# Patient Record
Sex: FEMALE | Race: WHITE
[De-identification: names, ages, dates, MRNs, and addresses within clinical notes are randomized per-mention and may not be internally consistent; named-entity substitution may affect disease eponyms.]

---

## 2019-02-11 ENCOUNTER — HOSPITAL ENCOUNTER (OUTPATIENT)
Dept: HOSPITAL 53 - M LAB | Age: 49
End: 2019-02-11
Attending: PHYSICIAN ASSISTANT
Payer: COMMERCIAL

## 2019-02-11 DIAGNOSIS — E55.9: Primary | ICD-10-CM

## 2019-02-11 DIAGNOSIS — R53.83: ICD-10-CM

## 2019-02-11 LAB
25(OH)D3 SERPL-MCNC: 27.8 NG/ML (ref 30–100)
ALBUMIN SERPL BCG-MCNC: 3.7 GM/DL (ref 3.2–5.2)
ALT SERPL W P-5'-P-CCNC: 28 U/L (ref 12–78)
BASOPHILS # BLD AUTO: 0 10^3/UL (ref 0–0.2)
BASOPHILS NFR BLD AUTO: 0.5 % (ref 0–1)
BILIRUB SERPL-MCNC: 0.3 MG/DL (ref 0.2–1)
BUN SERPL-MCNC: 18 MG/DL (ref 7–18)
CALCIUM SERPL-MCNC: 8.8 MG/DL (ref 8.5–10.1)
CHLORIDE SERPL-SCNC: 105 MEQ/L (ref 98–107)
CHOLEST SERPL-MCNC: 170 MG/DL (ref ?–200)
CHOLEST/HDLC SERPL: 2.3 {RATIO} (ref ?–5)
CO2 SERPL-SCNC: 29 MEQ/L (ref 21–32)
CREAT SERPL-MCNC: 0.89 MG/DL (ref 0.55–1.3)
EOSINOPHIL # BLD AUTO: 0.2 10^3/UL (ref 0–0.5)
EOSINOPHIL NFR BLD AUTO: 3 % (ref 0–3)
GFR SERPL CREATININE-BSD FRML MDRD: > 60 ML/MIN/{1.73_M2} (ref 58–?)
GLUCOSE SERPL-MCNC: 80 MG/DL (ref 70–100)
HCT VFR BLD AUTO: 42 % (ref 36–47)
HDLC SERPL-MCNC: 74 MG/DL (ref 40–?)
HGB BLD-MCNC: 13.5 G/DL (ref 12–15.5)
LDLC SERPL CALC-MCNC: 77 MG/DL (ref ?–100)
LYMPHOCYTES # BLD AUTO: 2.3 10^3/UL (ref 1.5–4.5)
LYMPHOCYTES NFR BLD AUTO: 29.8 % (ref 24–44)
MCH RBC QN AUTO: 29.2 PG (ref 27–33)
MCHC RBC AUTO-ENTMCNC: 32.1 G/DL (ref 32–36.5)
MCV RBC AUTO: 90.9 FL (ref 80–96)
MONOCYTES # BLD AUTO: 0.7 10^3/UL (ref 0–0.8)
MONOCYTES NFR BLD AUTO: 8.6 % (ref 0–5)
NEUTROPHILS # BLD AUTO: 4.4 10^3/UL (ref 1.8–7.7)
NEUTROPHILS NFR BLD AUTO: 57.7 % (ref 36–66)
NONHDLC SERPL-MCNC: 96 MG/DL
PLATELET # BLD AUTO: 286 10^3/UL (ref 150–450)
POTASSIUM SERPL-SCNC: 4.4 MEQ/L (ref 3.5–5.1)
PROT SERPL-MCNC: 7 GM/DL (ref 6.4–8.2)
RBC # BLD AUTO: 4.62 10^6/UL (ref 4–5.4)
SODIUM SERPL-SCNC: 140 MEQ/L (ref 136–145)
T4 FREE SERPL-MCNC: 0.83 NG/DL (ref 0.76–1.46)
TRIGL SERPL-MCNC: 96 MG/DL (ref ?–150)
TSH SERPL DL<=0.005 MIU/L-ACNC: 2.28 UIU/ML (ref 0.36–3.74)
WBC # BLD AUTO: 7.7 10^3/UL (ref 4–10)

## 2019-11-08 ENCOUNTER — HOSPITAL ENCOUNTER (EMERGENCY)
Dept: HOSPITAL 53 - M ED | Age: 49
Discharge: HOME | End: 2019-11-08
Payer: COMMERCIAL

## 2019-11-08 VITALS — DIASTOLIC BLOOD PRESSURE: 72 MMHG | SYSTOLIC BLOOD PRESSURE: 122 MMHG

## 2019-11-08 VITALS — WEIGHT: 142.31 LBS | BODY MASS INDEX: 26.19 KG/M2 | HEIGHT: 62 IN

## 2019-11-08 DIAGNOSIS — M77.32: Primary | ICD-10-CM

## 2019-11-08 DIAGNOSIS — J45.909: ICD-10-CM

## 2019-11-08 DIAGNOSIS — Z79.899: ICD-10-CM
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
## 2019-11-08 DIAGNOSIS — Z88.6: ICD-10-CM
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
## 2019-11-08 DIAGNOSIS — M79.675: ICD-10-CM
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
## 2019-11-08 NOTE — REP
Left great toe series:  Four views.

 

History:  Great toe pain and swelling.

 

Findings:  There is mild osteoarthritic spurring at the first MTP joint along its

lateral side.  A small accessory ossicle is seen and medial aspect of the joint.

No erosive changes seen.  Sesamoid bones are unremarkable.  Incidental note is

made of a small plantar calcaneal spur.

 

Impression:

 

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  Otherwise negative.

 

 

Electronically Signed by

Johnnie Rae MD 11/08/2019 01:22 P

## 2021-06-24 ENCOUNTER — HOSPITAL ENCOUNTER (OUTPATIENT)
Dept: HOSPITAL 53 - M WHC | Age: 51
End: 2021-06-24
Attending: UROLOGY
Payer: COMMERCIAL

## 2021-06-24 DIAGNOSIS — Z12.31: Primary | ICD-10-CM

## 2021-06-24 NOTE — REPMRS
Patient History

The patient states she had a clinical breast exam in  April 2021.

Patient is postmenopausal.

Family history of colorectal cancer at age 52 in father, ovarian 

cancer under age 50 in maternal aunt.

Patient states no breast complaints today. Patient has signed MRS

History Sheet.

 

Digital Woman Screen Mammo: June 24, 2021 - Exam #: 

[id number]

Bilateral CC and MLO view(s) were taken.

 

Technologist: Chanel Paz, Technologist

Prior study comparison: June 8, 2020, bilateral digital mammo 

screening bilat, performed at [facility].  March 21, 2019, bilateral digital mammo screening bilat, performed at 

[facility].  March 20, 2018, bilateral digital 

mammo screening bilat, performed at [facility].

 

FINDINGS: There are scattered fibroglandular densities.  The 

Volpara volumetric breast density category is:B.  There has been 

no change in the appearance of the mammogram from the prior 

studies.  There is a mild amount of scattered fibroglandular 

density which is fairly symmetric. There is no interval 

development of dominant mass, architectural distortion, or 

grouped microcalcification suggestive of malignancy.

3-D tomosynthesis shows no additional findings.

 

Assessment: BI-RADS/ACR category 1 mammogram. Negative Mammogram.

 

Recommendation

Routine screening mammogram of both breasts in 1 year (for women 

over age 40).

This patient's  [facility] Lifetime Breast Cancer Risk is 

estimated at 8.0 %.

This mammogram was interpreted with the aid of an FDA-approved 

computer-aided dectection system.

 

Electronically Signed By: Rene Rae MD 06/24/21 [id number]

## 2021-11-30 ENCOUNTER — HOSPITAL ENCOUNTER (OUTPATIENT)
Dept: HOSPITAL 53 - M LAB REF | Age: 51
End: 2021-11-30
Attending: PHYSICIAN ASSISTANT
Payer: COMMERCIAL

## 2021-11-30 DIAGNOSIS — N39.0: Primary | ICD-10-CM

## 2021-11-30 LAB
APPEARANCE UR: (no result)
BACTERIA UR QL AUTO: NEGATIVE
BILIRUB UR QL STRIP.AUTO: NEGATIVE
GLUCOSE UR QL STRIP.AUTO: NEGATIVE MG/DL
HGB UR QL STRIP.AUTO: (no result)
KETONES UR QL STRIP.AUTO: NEGATIVE MG/DL
LEUKOCYTE ESTERASE UR QL STRIP.AUTO: (no result)
MUCOUS THREADS URNS QL MICRO: (no result)
NITRITE UR QL STRIP.AUTO: NEGATIVE
PH UR STRIP.AUTO: 5 UNITS (ref 5–9)
PROT UR QL STRIP.AUTO: NEGATIVE MG/DL
RBC # UR AUTO: 12 /HPF (ref 0–3)
SP GR UR STRIP.AUTO: 1.02 (ref 1–1.03)
SQUAMOUS #/AREA URNS AUTO: 0 /HPF (ref 0–6)
UROBILINOGEN UR QL STRIP.AUTO: 0.2 MG/DL (ref 0–2)
WBC #/AREA URNS AUTO: 50 /HPF (ref 0–3)

## 2022-06-27 ENCOUNTER — HOSPITAL ENCOUNTER (OUTPATIENT)
Dept: HOSPITAL 53 - M WHC | Age: 52
End: 2022-06-27
Attending: UROLOGY
Payer: COMMERCIAL

## 2022-06-27 DIAGNOSIS — Z12.31: Primary | ICD-10-CM

## 2022-07-27 ENCOUNTER — HOSPITAL ENCOUNTER (OUTPATIENT)
Dept: HOSPITAL 53 - M LABSMTC | Age: 52
End: 2022-07-27
Attending: INTERNAL MEDICINE
Payer: COMMERCIAL

## 2022-07-27 DIAGNOSIS — Z01.812: Primary | ICD-10-CM

## 2022-07-27 DIAGNOSIS — Z20.822: ICD-10-CM

## 2022-08-01 ENCOUNTER — HOSPITAL ENCOUNTER (OUTPATIENT)
Dept: HOSPITAL 53 - M OPP | Age: 52
Discharge: HOME | End: 2022-08-01
Attending: INTERNAL MEDICINE
Payer: COMMERCIAL

## 2022-08-01 VITALS — WEIGHT: 139.4 LBS | BODY MASS INDEX: 25.65 KG/M2 | HEIGHT: 62 IN

## 2022-08-01 VITALS — SYSTOLIC BLOOD PRESSURE: 114 MMHG | DIASTOLIC BLOOD PRESSURE: 65 MMHG

## 2022-08-01 DIAGNOSIS — Z91.89: ICD-10-CM

## 2022-08-01 DIAGNOSIS — K64.0: ICD-10-CM

## 2022-08-01 DIAGNOSIS — Z79.899: ICD-10-CM

## 2022-08-01 DIAGNOSIS — D12.6: ICD-10-CM

## 2022-08-01 DIAGNOSIS — K21.00: ICD-10-CM

## 2022-08-01 DIAGNOSIS — Z12.11: Primary | ICD-10-CM

## 2022-08-01 DIAGNOSIS — Z88.6: ICD-10-CM

## 2022-08-01 DIAGNOSIS — J45.909: ICD-10-CM

## 2022-08-01 DIAGNOSIS — Z79.51: ICD-10-CM

## 2022-10-16 ENCOUNTER — HOSPITAL ENCOUNTER (OUTPATIENT)
Dept: HOSPITAL 53 - M LAB REF | Age: 52
End: 2022-10-16
Attending: PHYSICIAN ASSISTANT
Payer: COMMERCIAL

## 2022-10-16 DIAGNOSIS — N39.0: Primary | ICD-10-CM

## 2022-10-16 LAB
APPEARANCE UR: (no result)
BACTERIA URNS QL MICRO: (no result)
BILIRUB UR QL STRIP: NEGATIVE
COLOR UR: YELLOW
GLUCOSE UR STRIP-MCNC: NEGATIVE MG/DL
HGB UR QL STRIP: POSITIVE
KETONES UR QL STRIP: NEGATIVE MG/DL
LEUKOCYTE ESTERASE UR QL STRIP: POSITIVE
NITRITE UR QL STRIP: POSITIVE
PH UR STRIP: 8 UNITS (ref 5–7)
PROT UR STRIP-MCNC: (no result) MG/DL
RBC #/AREA URNS HPF: (no result) /HPF (ref 0–3)
SP GR UR STRIP: 1.01 (ref 1–1.03)
SQUAMOUS URNS QL MICRO: (no result) /HPF
UROBILINOGEN UR QL STRIP: NORMAL MG/DL
WBC #/AREA URNS HPF: (no result) /HPF (ref 0–3)

## 2023-07-10 ENCOUNTER — HOSPITAL ENCOUNTER (OUTPATIENT)
Dept: HOSPITAL 53 - M WHC | Age: 53
End: 2023-07-10
Payer: COMMERCIAL

## 2023-07-10 DIAGNOSIS — Z12.31: Primary | ICD-10-CM

## 2023-08-07 ENCOUNTER — HOSPITAL ENCOUNTER (OUTPATIENT)
Dept: HOSPITAL 53 - M LAB REF | Age: 53
End: 2023-08-07
Attending: PHYSICIAN ASSISTANT
Payer: COMMERCIAL

## 2023-08-07 DIAGNOSIS — N39.0: Primary | ICD-10-CM

## 2023-08-07 LAB
APPEARANCE UR: (no result)
BACTERIA UR QL AUTO: (no result)
BILIRUB UR QL STRIP.AUTO: NEGATIVE
GLUCOSE UR QL STRIP.AUTO: NEGATIVE MG/DL
HGB UR QL STRIP.AUTO: (no result)
KETONES UR QL STRIP.AUTO: NEGATIVE MG/DL
LEUKOCYTE ESTERASE UR QL STRIP.AUTO: (no result)
MUCOUS THREADS URNS QL MICRO: (no result)
NITRITE UR QL STRIP.AUTO: NEGATIVE
PH UR STRIP.AUTO: 5 UNITS (ref 5–9)
PROT UR QL STRIP.AUTO: (no result) MG/DL
RBC # UR AUTO: (no result) /HPF (ref 0–3)
SP GR UR STRIP.AUTO: 1.02 (ref 1–1.03)
SQUAMOUS #/AREA URNS AUTO: 1 /HPF (ref 0–6)
UROBILINOGEN UR QL STRIP.AUTO: 0.2 MG/DL (ref 0–2)
WBC #/AREA URNS AUTO: 7 /HPF (ref 0–3)

## 2023-08-14 ENCOUNTER — HOSPITAL ENCOUNTER (OUTPATIENT)
Dept: HOSPITAL 53 - M SDC | Age: 53
Setting detail: OBSERVATION
LOS: 1 days | Discharge: HOME | End: 2023-08-15
Attending: OBSTETRICS & GYNECOLOGY | Admitting: OBSTETRICS & GYNECOLOGY
Payer: COMMERCIAL

## 2023-08-14 VITALS — OXYGEN SATURATION: 98 % | DIASTOLIC BLOOD PRESSURE: 73 MMHG | TEMPERATURE: 97.8 F | SYSTOLIC BLOOD PRESSURE: 109 MMHG

## 2023-08-14 VITALS — DIASTOLIC BLOOD PRESSURE: 67 MMHG | SYSTOLIC BLOOD PRESSURE: 103 MMHG | OXYGEN SATURATION: 98 % | TEMPERATURE: 97.7 F

## 2023-08-14 VITALS — HEIGHT: 62 IN | BODY MASS INDEX: 26.68 KG/M2 | WEIGHT: 145 LBS

## 2023-08-14 VITALS — SYSTOLIC BLOOD PRESSURE: 95 MMHG | DIASTOLIC BLOOD PRESSURE: 62 MMHG | OXYGEN SATURATION: 99 % | TEMPERATURE: 97.6 F

## 2023-08-14 VITALS — TEMPERATURE: 97.3 F | OXYGEN SATURATION: 98 % | DIASTOLIC BLOOD PRESSURE: 65 MMHG | SYSTOLIC BLOOD PRESSURE: 124 MMHG

## 2023-08-14 VITALS — DIASTOLIC BLOOD PRESSURE: 58 MMHG | SYSTOLIC BLOOD PRESSURE: 116 MMHG | TEMPERATURE: 97.3 F | OXYGEN SATURATION: 97 %

## 2023-08-14 VITALS — TEMPERATURE: 97.7 F | DIASTOLIC BLOOD PRESSURE: 63 MMHG | OXYGEN SATURATION: 98 % | SYSTOLIC BLOOD PRESSURE: 108 MMHG

## 2023-08-14 DIAGNOSIS — D25.9: ICD-10-CM

## 2023-08-14 DIAGNOSIS — N80.03: ICD-10-CM

## 2023-08-14 DIAGNOSIS — Z88.8: ICD-10-CM

## 2023-08-14 DIAGNOSIS — N81.3: Primary | ICD-10-CM

## 2023-08-14 DIAGNOSIS — Z91.041: ICD-10-CM

## 2023-08-14 LAB
HCT VFR BLD AUTO: 41 % (ref 36–47)
HGB BLD-MCNC: 13.2 G/DL (ref 12–15.5)
MCH RBC QN AUTO: 28.8 PG (ref 27–33)
MCHC RBC AUTO-ENTMCNC: 32.2 G/DL (ref 32–36.5)
MCV RBC AUTO: 89.5 FL (ref 80–96)
PLATELET # BLD AUTO: 320 10^3/UL (ref 150–450)
RBC # BLD AUTO: 4.58 10^6/UL (ref 4–5.4)
WBC # BLD AUTO: 8.6 10^3/UL (ref 4–10)

## 2023-08-14 PROCEDURE — 86900 BLOOD TYPING SEROLOGIC ABO: CPT

## 2023-08-14 PROCEDURE — 88302 TISSUE EXAM BY PATHOLOGIST: CPT

## 2023-08-14 PROCEDURE — 85027 COMPLETE CBC AUTOMATED: CPT

## 2023-08-14 PROCEDURE — 58571 TLH W/T/O 250 G OR LESS: CPT

## 2023-08-14 PROCEDURE — 57260 CMBN ANT PST COLPRHY: CPT

## 2023-08-14 PROCEDURE — 57283 COLPOPEXY INTRAPERITONEAL: CPT

## 2023-08-14 PROCEDURE — 36415 COLL VENOUS BLD VENIPUNCTURE: CPT

## 2023-08-14 PROCEDURE — 86850 RBC ANTIBODY SCREEN: CPT

## 2023-08-14 PROCEDURE — 86901 BLOOD TYPING SEROLOGIC RH(D): CPT

## 2023-08-14 PROCEDURE — 88307 TISSUE EXAM BY PATHOLOGIST: CPT

## 2023-08-14 RX ADMIN — SODIUM CHLORIDE, POTASSIUM CHLORIDE, SODIUM LACTATE AND CALCIUM CHLORIDE SCH MLS/HR: 600; 310; 30; 20 INJECTION, SOLUTION INTRAVENOUS at 19:00

## 2023-08-14 RX ADMIN — DOCUSATE SODIUM SCH MG: 100 CAPSULE, LIQUID FILLED ORAL at 22:21

## 2023-08-15 VITALS — SYSTOLIC BLOOD PRESSURE: 111 MMHG | TEMPERATURE: 97.8 F | DIASTOLIC BLOOD PRESSURE: 50 MMHG | OXYGEN SATURATION: 97 %

## 2023-08-15 VITALS — TEMPERATURE: 98 F | DIASTOLIC BLOOD PRESSURE: 56 MMHG | OXYGEN SATURATION: 98 % | SYSTOLIC BLOOD PRESSURE: 109 MMHG

## 2023-08-15 VITALS — TEMPERATURE: 98.4 F | OXYGEN SATURATION: 99 % | SYSTOLIC BLOOD PRESSURE: 98 MMHG | DIASTOLIC BLOOD PRESSURE: 56 MMHG

## 2023-08-15 RX ADMIN — SODIUM CHLORIDE, POTASSIUM CHLORIDE, SODIUM LACTATE AND CALCIUM CHLORIDE SCH MLS/HR: 600; 310; 30; 20 INJECTION, SOLUTION INTRAVENOUS at 08:05

## 2023-08-15 RX ADMIN — DOCUSATE SODIUM SCH MG: 100 CAPSULE, LIQUID FILLED ORAL at 08:26

## 2023-08-15 RX ADMIN — SODIUM CHLORIDE, POTASSIUM CHLORIDE, SODIUM LACTATE AND CALCIUM CHLORIDE SCH MLS/HR: 600; 310; 30; 20 INJECTION, SOLUTION INTRAVENOUS at 00:05

## 2024-09-09 ENCOUNTER — HOSPITAL ENCOUNTER (OUTPATIENT)
Dept: HOSPITAL 53 - M WHC | Age: 54
End: 2024-09-09
Attending: NURSE PRACTITIONER
Payer: COMMERCIAL

## 2024-09-09 DIAGNOSIS — M85.80: ICD-10-CM

## 2024-09-09 DIAGNOSIS — Z12.31: Primary | ICD-10-CM
